# Patient Record
Sex: MALE | Race: WHITE | HISPANIC OR LATINO | Employment: FULL TIME | URBAN - METROPOLITAN AREA
[De-identification: names, ages, dates, MRNs, and addresses within clinical notes are randomized per-mention and may not be internally consistent; named-entity substitution may affect disease eponyms.]

---

## 2022-09-10 ENCOUNTER — APPOINTMENT (OUTPATIENT)
Dept: RADIOLOGY | Facility: HOSPITAL | Age: 42
End: 2022-09-10
Attending: EMERGENCY MEDICINE
Payer: COMMERCIAL

## 2022-09-10 ENCOUNTER — APPOINTMENT (OUTPATIENT)
Dept: MRI IMAGING | Facility: HOSPITAL | Age: 42
End: 2022-09-10
Attending: PHYSICIAN ASSISTANT
Payer: COMMERCIAL

## 2022-09-10 ENCOUNTER — HOSPITAL ENCOUNTER (EMERGENCY)
Facility: HOSPITAL | Age: 42
Discharge: HOME | End: 2022-09-10
Attending: EMERGENCY MEDICINE | Admitting: EMERGENCY MEDICINE
Payer: COMMERCIAL

## 2022-09-10 VITALS
BODY MASS INDEX: 30.39 KG/M2 | TEMPERATURE: 97.2 F | RESPIRATION RATE: 16 BRPM | WEIGHT: 178 LBS | OXYGEN SATURATION: 100 % | HEART RATE: 73 BPM | HEIGHT: 64 IN | SYSTOLIC BLOOD PRESSURE: 131 MMHG | DIASTOLIC BLOOD PRESSURE: 79 MMHG

## 2022-09-10 DIAGNOSIS — G56.02 CARPAL TUNNEL SYNDROME OF LEFT WRIST: ICD-10-CM

## 2022-09-10 LAB
ANION GAP SERPL CALCULATED.3IONS-SCNC: 8 MMOL/L (ref 5–18)
BUN SERPL-MCNC: 9 MG/DL (ref 8–22)
CALCIUM SERPL-MCNC: 9.1 MG/DL (ref 8.5–10.5)
CHLORIDE BLD-SCNC: 107 MMOL/L (ref 98–107)
CO2 SERPL-SCNC: 24 MMOL/L (ref 22–31)
CREAT SERPL-MCNC: 0.7 MG/DL (ref 0.7–1.3)
GFR SERPL CREATININE-BSD FRML MDRD: >90 ML/MIN/1.73M2
GLUCOSE BLD-MCNC: 110 MG/DL (ref 70–125)
HOLD SPECIMEN: NORMAL
POTASSIUM BLD-SCNC: 4 MMOL/L (ref 3.5–5)
SODIUM SERPL-SCNC: 139 MMOL/L (ref 136–145)

## 2022-09-10 PROCEDURE — 82310 ASSAY OF CALCIUM: CPT | Performed by: PHYSICIAN ASSISTANT

## 2022-09-10 PROCEDURE — 36415 COLL VENOUS BLD VENIPUNCTURE: CPT | Performed by: EMERGENCY MEDICINE

## 2022-09-10 PROCEDURE — A9585 GADOBUTROL INJECTION: HCPCS | Performed by: EMERGENCY MEDICINE

## 2022-09-10 PROCEDURE — 70553 MRI BRAIN STEM W/O & W/DYE: CPT

## 2022-09-10 PROCEDURE — 71046 X-RAY EXAM CHEST 2 VIEWS: CPT

## 2022-09-10 PROCEDURE — 255N000002 HC RX 255 OP 636: Performed by: EMERGENCY MEDICINE

## 2022-09-10 PROCEDURE — 96374 THER/PROPH/DIAG INJ IV PUSH: CPT | Mod: 59

## 2022-09-10 PROCEDURE — 250N000011 HC RX IP 250 OP 636: Performed by: EMERGENCY MEDICINE

## 2022-09-10 PROCEDURE — 72141 MRI NECK SPINE W/O DYE: CPT

## 2022-09-10 PROCEDURE — 99285 EMERGENCY DEPT VISIT HI MDM: CPT | Mod: 25

## 2022-09-10 RX ORDER — KETOROLAC TROMETHAMINE 15 MG/ML
15 INJECTION, SOLUTION INTRAMUSCULAR; INTRAVENOUS ONCE
Status: COMPLETED | OUTPATIENT
Start: 2022-09-10 | End: 2022-09-10

## 2022-09-10 RX ORDER — GADOBUTROL 604.72 MG/ML
10 INJECTION INTRAVENOUS ONCE
Status: COMPLETED | OUTPATIENT
Start: 2022-09-10 | End: 2022-09-10

## 2022-09-10 RX ADMIN — GADOBUTROL 10 ML: 604.72 INJECTION INTRAVENOUS at 13:28

## 2022-09-10 RX ADMIN — KETOROLAC TROMETHAMINE 15 MG: 15 INJECTION, SOLUTION INTRAMUSCULAR; INTRAVENOUS at 12:13

## 2022-09-10 ASSESSMENT — ENCOUNTER SYMPTOMS
CHILLS: 0
FLANK PAIN: 0
CHEST TIGHTNESS: 0
PHOTOPHOBIA: 0
NUMBNESS: 1
HEMATURIA: 0
DYSURIA: 0
FATIGUE: 0
SHORTNESS OF BREATH: 0
ABDOMINAL PAIN: 0
FEVER: 0
EYE PAIN: 0
WEAKNESS: 1
WOUND: 0

## 2022-09-10 NOTE — ED PROVIDER NOTES
"Emergency Department Midlevel Supervisory Note     I personally saw the patient and performed a substantive portion of the visit including all aspects of the medical decision making.    ED Course:  11:27 AM Enoc Clarke PA-C staffed patient with me. I agree with their assessment and plan of management, and I will see the patient.  11:36 AM I met with the patient to introduce myself, gather additional history, perform my initial exam, and discuss the plan.     Brief HPI:     Louis De Leon is a 42 year old male who presents for evaluation of one-sided arm weakness, pain.    History obtained using  via language line.     Patient reports he has had intermittent left upper extremity pain and weakness for the past 2 months. He states that he is unable to make a fist and has difficulty moving his fingers. He notes pain that is worse at night, not when he is lying down, or using the arms. He notes that he has pain when he lays down at night, and the pain starts in his left wrist, and radiates up his left arm.    I, Clyde Padgett, am serving as a scribe to document services personally performed by Brock Yuen MD,, based on my observations and the provider's statements to me.   I, Brock Yuen MD, attest that Clyde Padgett was acting in a scribe capacity, has observed my performance of the services and has documented them in accordance with my direction.    Brief Physical Exam: /79 (BP Location: Left arm)   Pulse 73   Temp 97.2  F (36.2  C) (Temporal)   Resp 16   Ht 1.626 m (5' 4\")   Wt 80.7 kg (178 lb)   SpO2 100%   BMI 30.55 kg/m    Constitutional:  Alert, in no acute distress  EYES: Conjunctivae clear  HENT:  Atraumatic, normocephalic  Respiratory:  Respirations even, unlabored, in no acute respiratory distress  Cardiovascular:  Regular rate and rhythm, good peripheral perfusion  GI: Soft, nondistended, nontender, no palpable masses, no rebound, no guarding "   Musculoskeletal:  No edema. No cyanosis. Range of motion major extremities intact.    Integument: Warm, Dry, No erythema, No rash.   Neurologic:  Alert & oriented, no focal deficits noted  Psych: Normal mood and affect     MDM:  Again, patient is a 42-year-old male who presents with left upper extremity weakness.  Patient appears nontoxic with stable vital signs.  On my exam he has no outward signs of trauma, no skin changes or rashes, no joint swelling, erythema or warmth.  He endorses equal sensation bilateral upper extremities including bilateral axial, radial, ulnar and median nerve distributions.  Denies any recent falls or trauma.  No unilateral upper extremity swelling.  Symptoms not aggravated by elevating hands or with neck rotation.  History and exam consistent with cervical radiculopathy, considered but lower suspicion for cervical spinal cord lesion, myelitis, epidural bleed or abscess, nothing to suggest cervical spine fracture or dislocation, considered but overall very low suspicion for CVA and again the patient has had the symptoms for quite some time certainly no indication for code stroke.  Low suspicion for thoracic outlet syndrome or shoulder fracture, dislocation.  Nothing to suggest ACS, PE, dissection or other more malicious etiology of symptoms.  We will obtain screening labs, chest x-ray and MRI imaging of the head and cervical spine and likely consult with neurosurgery or neurology depending upon the results.  Patient was given Toradol for pain.    ED Course as of 09/10/22 1754   Sat Sep 10, 2022   1141 Patient is a 42-year-old male with no past medical history who presents emergency department for evaluation of tingling, numbness, weakness in his left hand which has been ongoing for 2 months.  Additionally notes weakness in his right index finger.  Notes that the weakness and numbness has been progressive, slowly getting worse for the last 2 months.  Was seen previously in [facility]  family [facility] for the same thing, however I am unable to review their notes, the patient is unsure what kind of testing was done.  On examination, sensation to soft touch is intact upper lower extremities, no pronator drift, no dizziness, no headaches, extraocular eye movements are intact, no chest pain.  Range of motion of the shoulder is intact, range of motion of the elbow is intact.  Patient is unable to make a complete fist with his left hand.  When assessing for individual finger strength, against resisted movement, the patient is able to resist movement with all fingers on the left hand except for the middle finger.  No other acute abnormalities noted on the left arm.  Mild tenderness to palpation of the medial aspect of the wrist over the carpal tunnel.  Additionally patient notes inability to completely flex his right index finger.  The remainder of his right hand is unremarkable, no tenderness.  No recent trauma, no fevers, nothing to suggest infection, or broken bones, or tendon damage.  Patient is not any medications, does not have a past medical history.  Only medication he takes occasionally is Tylenol.  No other focal exam findings, or focal neurological signs.  Nothing to suggest neurological compromise.  Pulses are intact, nothing to suggest upper extremity DVT.  No cardiac history.  No chest pain makes atypical ACS extremely unlikely.     1144 No trauma makes a fracture unlikely.  Differential is somewhat obscure given the patient's odd constellation of symptoms, however could be some cervical radiculopathy, or lumbar herniation in the neck, causing some nerve impingement and nerve symptoms.  Additionally could be symptoms related to carpal tunnel syndrome.  Plan will be to obtain MRI of the brain and cervical spine to rule out any neurological abnormality, additionally will order a BMP to evaluate his electrolytes.   1426 Cervical spine MRI w/o contrast  Head MRI shows no acute infarct, mass, or  hemorrhage.  Cervical spine MRI shows normal cervical and upper thoracic cord, some suggestion of abnormal cord signal on some sequences, however does not well correlated with the sagittal and axial T2 weighted sequences.  Presumably artifact.  Mild to moderate degenerative changes without significant canal narrowing at any level.  Mild right foraminal narrowing at C4-C5, and C5-C6.   1611 Discussed the case with both neurosurgery, as well as neurology.  Both are not terribly impressed with the imaging findings, no concern for any acute stroke, or other neurological abnormality at this time.  Patient seen and updated on imaging results, likely that symptoms are secondary to carpal tunnel syndrome.  Will recommend that he discharges with a wrist splint, follows up with neurology.  I have placed a consult for neurology, they should call him to schedule an appointment.  I will put the number for his discharge in his discharge paperwork.  Additionally will provide a work note for the patient.  Discussed return precautions, patient and brother expressed understanding.  Plan for discharge at this time.  Likely diagnosis is carpal tunnel syndrome of the left wrist.       1. Carpal tunnel syndrome of left wrist        Labs and Imaging:  Results for orders placed or performed during the hospital encounter of 09/10/22   MR Brain w/o & w Contrast    Impression    IMPRESSION:  HEAD MRI:   1.  No acute infarct, mass, mass effect, or hemorrhage.    CERVICAL SPINE MRI:  1.  Normal cervical and upper thoracic cord. There is suggestion of abnormal cord signal on some sequences, mainly sagittal STIR sequence; however it is not well correlated with sagittal and axial T2-weighted sequences and presumably artifactual.  2.  Minimal to mild degenerative changes without significant canal narrowing at any level.  3.  Mild right foraminal narrowing at C4-C5 and C5-C6.   Cervical spine MRI w/o contrast    Impression    IMPRESSION:  HEAD MRI:    1.  No acute infarct, mass, mass effect, or hemorrhage.    CERVICAL SPINE MRI:  1.  Normal cervical and upper thoracic cord. There is suggestion of abnormal cord signal on some sequences, mainly sagittal STIR sequence; however it is not well correlated with sagittal and axial T2-weighted sequences and presumably artifactual.  2.  Minimal to mild degenerative changes without significant canal narrowing at any level.  3.  Mild right foraminal narrowing at C4-C5 and C5-C6.   XR Chest 2 Views    Impression    IMPRESSION: Negative chest.   Extra Blue Top Tube   Result Value Ref Range    Hold Specimen JIC    Extra Red Top Tube   Result Value Ref Range    Hold Specimen JIC    Extra Green Top (Lithium Heparin) Tube   Result Value Ref Range    Hold Specimen JIC    Extra Purple Top Tube   Result Value Ref Range    Hold Specimen JIC    Basic metabolic panel   Result Value Ref Range    Sodium 139 136 - 145 mmol/L    Potassium 4.0 3.5 - 5.0 mmol/L    Chloride 107 98 - 107 mmol/L    Carbon Dioxide (CO2) 24 22 - 31 mmol/L    Anion Gap 8 5 - 18 mmol/L    Urea Nitrogen 9 8 - 22 mg/dL    Creatinine 0.70 0.70 - 1.30 mg/dL    Calcium 9.1 8.5 - 10.5 mg/dL    Glucose 110 70 - 125 mg/dL    GFR Estimate >90 >60 mL/min/1.73m2     I have reviewed the relevant laboratory and radiology studies    Procedures:  I was present for the key portions of this procedure: none    Brock Yuen MD  [facility] EMERGENCY DEPARTMENT  [address] [address]  [phone]       Brock Yuen MD  09/10/22 7204

## 2022-09-10 NOTE — ED TRIAGE NOTES
The patient states left shoulder to the hand pain and weakness x approx 2 months, Right arm shoulder down to hand weakness and notes he does not have enough strength to make a fist. Pt states that he has also has right knee to foot pain and left knee to foot pain. The pt feels left arm swelling. The pt has tried to get into see a primary MD but has not been able to get in. The right hand pointer finger swelling.

## 2022-09-10 NOTE — DISCHARGE INSTRUCTIONS
You were seen here in the emergency department for evaluation of numbness, tingling to your left hand.  We did do an MRI which did not show any neurological deficits, or any sign of any intracranial bleeding, or other problem.  I did speak with neurosurgery, as well as neurology regarding recommendations.  Neurology did recommend a wrist splint, which we will send you home with, as well as follow-up with them in clinic for further testing.  Return to the emergency department if you develop any new or worsening symptoms.  Especially return to the emergency department if you develop any chest pain, shortness of breath, difficulty breathing, worsening numbness or tingling, difficulty with movement, or any other symptoms.    For pain or fever you may use:  -Tylenol 650 mg every 6 hours.  Max 4000 mg in 24 hours  Do not use thismedication with alcohol as it can cause liver problems.  -Ibuprofen 600 mg every 6 hours.  Max 3500 mg in 24 hours  Do not take this medication if you have a history of a GI bleed or have kidney problems.  You may use both of these medications at the same time or you can alternate them every 3 hours.  For example, Tylenol at 6 AM, ibuprofen at 9 AM, Tylenol at noon, etc.

## 2022-09-10 NOTE — ED PROVIDER NOTES
EMERGENCY DEPARTMENT ENCOUNTER      NAME: Louis De Leon  AGE: 42 year old male  YOB: 1980  MRN: [id number]  EVALUATION DATE & TIME: No admission date for patient encounter.    PCP: No primary care provider on file.    ED PROVIDER: Lizandro Clarke PA-C      Chief Complaint   Patient presents with     One-sided Weakness         FINAL IMPRESSION:  1. Carpal tunnel syndrome of left wrist        ED COURSE & MEDICAL DECISION MAKING:    Pertinent Labs & Imaging studies reviewed. (See chart for details)  11:34 AM I met the patient and performed my initial interview and exam. Staffed with Dr. Yuen   2:56 PM Spoke with Latonia, Neurosurgery. No immediate recommendations from a neurosurgical perspective.   3:12 PM Spoke with Dr. Guan, neurology to discuss the patients symptoms. Given the negative MRI, neurology has minimal concern. Presentation seems most consistent with possible carpal tunnel syndrome. Recommend follow up in neurology clinic for possible EMG and then wrist splint.   3:36 PM Patient seen and reevaluated, updated on plan for follow up in clinic. Will be given a wrist splint prior to discharge here from the ED.     42 year old male presents to the Emergency Department for evaluation of left arm weakness, numbness, decrease strength     ED Course as of 09/10/22 1613   Sat Sep 10, 2022   1141 Patient is a 42-year-old male with no past medical history who presents emergency department for evaluation of tingling, numbness, weakness in his left hand which has been ongoing for 2 months.  Additionally notes weakness in his right index finger.  Notes that the weakness and numbness has been progressive, slowly getting worse for the last 2 months.  Was seen previously in [facility] for the same thing, however I am unable to review their notes, the patient is unsure what kind of testing was done.  On examination, sensation to soft touch is intact upper lower extremities, no  pronator drift, no dizziness, no headaches, extraocular eye movements are intact, no chest pain.  Range of motion of the shoulder is intact, range of motion of the elbow is intact.  Patient is unable to make a complete fist with his left hand.  When assessing for individual finger strength, against resisted movement, the patient is able to resist movement with all fingers on the left hand except for the middle finger.  No other acute abnormalities noted on the left arm.  Mild tenderness to palpation of the medial aspect of the wrist over the carpal tunnel.  Additionally patient notes inability to completely flex his right index finger.  The remainder of his right hand is unremarkable, no tenderness.  No recent trauma, no fevers, nothing to suggest infection, or broken bones, or tendon damage.  Patient is not any medications, does not have a past medical history.  Only medication he takes occasionally is Tylenol.  No other focal exam findings, or focal neurological signs.  Nothing to suggest neurological compromise.  Pulses are intact, nothing to suggest upper extremity DVT.  No cardiac history.  No chest pain makes atypical ACS extremely unlikely.     1144 No trauma makes a fracture unlikely.  Differential is somewhat obscure given the patient's odd constellation of symptoms, however could be some cervical radiculopathy, or lumbar herniation in the neck, causing some nerve impingement and nerve symptoms.  Additionally could be symptoms related to carpal tunnel syndrome.  Plan will be to obtain MRI of the brain and cervical spine to rule out any neurological abnormality, additionally will order a BMP to evaluate his electrolytes.   1426 Cervical spine MRI w/o contrast  Head MRI shows no acute infarct, mass, or hemorrhage.  Cervical spine MRI shows normal cervical and upper thoracic cord, some suggestion of abnormal cord signal on some sequences, however does not well correlated with the sagittal and axial T2 weighted  sequences.  Presumably artifact.  Mild to moderate degenerative changes without significant canal narrowing at any level.  Mild right foraminal narrowing at C4-C5, and C5-C6.   1611 Discussed the case with both neurosurgery, as well as neurology.  Both are not terribly impressed with the imaging findings, no concern for any acute stroke, or other neurological abnormality at this time.  Patient seen and updated on imaging results, likely that symptoms are secondary to carpal tunnel syndrome.  Will recommend that he discharges with a wrist splint, follows up with neurology.  I have placed a consult for neurology, they should call him to schedule an appointment.  I will put the number for his discharge in his discharge paperwork.  Additionally will provide a work note for the patient.  Discussed return precautions, patient and brother expressed understanding.  Plan for discharge at this time.  Likely diagnosis is carpal tunnel syndrome of the left wrist.        At the conclusion of the encounter I discussed the results of all of the tests and the disposition. The questions were answered. The patient or family acknowledged understanding and was agreeable with the care plan.     0 mimutes of critical care time     MEDICATIONS GIVEN IN THE EMERGENCY:  Medications   ketorolac (TORADOL) injection 15 mg (15 mg Intravenous Given 9/10/22 1213)   gadobutrol (GADAVIST) injection 10 mL (10 mLs Intravenous Given 9/10/22 1328)       NEW PRESCRIPTIONS STARTED AT TODAY'S ER VISIT  There are no discharge medications for this patient.         =================================================================    HPI    Patient information was obtained from: Patient     Use of : Yes Phone- Language Azeri         Louis De Leon is a 42 year old male with no significant past medical history who presents to this ED for evaluation of 2 months of off-and-on one-sided arm weakness, some pain in the left wrist.  Patient notes  that he is unable to make a complete fist with the left hand, feels like he has difficult time moving his fingers sometimes on the left hand.  He notes pain that is worse at night, not when he is lying down, or using the arms.  He notes that he has pain when he lays down at night, and the pain starts in his left wrist, and radiates up his left arm.  Additionally notes he has difficult time moving his right index finger, has weakness.  Does have sensation intact.  He is right-hand dominant.  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  He is still able to work.  No fevers.  No cough.  No chest pain.  No shortness of breath.  No headaches, dizziness, changes in vision, recent trauma, loss of consciousness, or other past medical history.  He does not take any medications on a daily basis other than Tylenol.  No other known past medical history.  Notes that the symptoms are intermittent, however been slowly and progressively getting worse for the last 2 months.  No other acute complaints other than the left sided arm, wrist pain and weakness.      REVIEW OF SYSTEMS   Review of Systems   Constitutional: Negative for chills, fatigue and fever.   Eyes: Negative for photophobia and pain.   Respiratory: Negative for chest tightness and shortness of breath.    Cardiovascular: Negative for chest pain.   Gastrointestinal: Negative for abdominal pain.   Genitourinary: Negative for dysuria, flank pain and hematuria.   Skin: Negative for wound.   Neurological: Positive for weakness and numbness.        Present to the left hand, left wrist, right index finger.    All other systems reviewed and are negative.       PAST MEDICAL HISTORY:  History reviewed. No pertinent past medical history.    PAST SURGICAL HISTORY:  History reviewed. No pertinent surgical history.        CURRENT MEDICATIONS:    No current outpatient medications on file.       ALLERGIES:  No Known Allergies    FAMILY HISTORY:  History reviewed. No  "pertinent family history.    SOCIAL HISTORY:        VITALS:  /79 (BP Location: Left arm)   Pulse 73   Temp 97.2  F (36.2  C) (Temporal)   Resp 16   Ht 1.626 m (5' 4\")   Wt 80.7 kg (178 lb)   SpO2 100%   BMI 30.55 kg/m      PHYSICAL EXAM    Physical Exam  Vitals and nursing note reviewed.   Constitutional:       General: He is not in acute distress.     Appearance: Normal appearance. He is normal weight. He is not toxic-appearing or diaphoretic.   HENT:      Head: Normocephalic.      Right Ear: External ear normal.      Left Ear: External ear normal.   Eyes:      General: No visual field deficit.  Cardiovascular:      Rate and Rhythm: Normal rate and regular rhythm.      Heart sounds: Normal heart sounds. No murmur heard.    No friction rub. No gallop.   Pulmonary:      Effort: Pulmonary effort is normal. No respiratory distress.      Breath sounds: No wheezing.   Abdominal:      General: Abdomen is flat. Bowel sounds are normal. There is no distension.      Palpations: Abdomen is soft.      Tenderness: There is no abdominal tenderness. There is no right CVA tenderness, left CVA tenderness, guarding or rebound.   Musculoskeletal:         General: No swelling, tenderness, deformity or signs of injury.      Comments: ROM intact passively, see neuro section for greater detail on strength assessment for the individual fingers. No tenderness with palpation over the rest of the left arm. ROM intact in left shoulder, left elbow, no difficulties.    Skin:     General: Skin is warm.   Neurological:      Mental Status: He is alert. Mental status is at baseline.      Cranial Nerves: No cranial nerve deficit or facial asymmetry.      Motor: Weakness present. No pronator drift.      Comments: Unable to make a fist with the left hand. Weakness when attempting to resist with flexion on the right index finger. Strength intact and no weakness with resisted movement except for with the middle finger on the left hand. " Sensation to soft touch intact to upper and lower extremities bilaterally.        LAB:  All pertinent labs reviewed and interpreted.  Labs Ordered and Resulted from Time of ED Arrival to Time of ED Departure   BASIC METABOLIC PANEL - Normal       Result Value    Sodium 139      Potassium 4.0      Chloride 107      Carbon Dioxide (CO2) 24      Anion Gap 8      Urea Nitrogen 9      Creatinine 0.70      Calcium 9.1      Glucose 110      GFR Estimate >90         RADIOLOGY:  Reviewed all pertinent imaging. Please see official radiology report.  XR Chest 2 Views   Final Result   IMPRESSION: Negative chest.      Cervical spine MRI w/o contrast   Final Result   IMPRESSION:   HEAD MRI:    1.  No acute infarct, mass, mass effect, or hemorrhage.      CERVICAL SPINE MRI:   1.  Normal cervical and upper thoracic cord. There is suggestion of abnormal cord signal on some sequences, mainly sagittal STIR sequence; however it is not well correlated with sagittal and axial T2-weighted sequences and presumably artifactual.   2.  Minimal to mild degenerative changes without significant canal narrowing at any level.   3.  Mild right foraminal narrowing at C4-C5 and C5-C6.      MR Brain w/o & w Contrast   Final Result   IMPRESSION:   HEAD MRI:    1.  No acute infarct, mass, mass effect, or hemorrhage.      CERVICAL SPINE MRI:   1.  Normal cervical and upper thoracic cord. There is suggestion of abnormal cord signal on some sequences, mainly sagittal STIR sequence; however it is not well correlated with sagittal and axial T2-weighted sequences and presumably artifactual.   2.  Minimal to mild degenerative changes without significant canal narrowing at any level.   3.  Mild right foraminal narrowing at C4-C5 and C5-C6.        PROCEDURES:   None.     Lizandro Clarke PA-C  Emergency Medicine  [facility] EMERGENCY DEPARTMENT  [facility] [facility] [address] [phone]  Dept:  [phone]     Lizandro Clarke PA-C  09/10/22 8639

## 2022-09-10 NOTE — Clinical Note
Louis De Leon was seen and treated in our emergency department on 9/10/2022.  He may return to work on 09/19/2022.  You were seen here in the emergency department for evaluation of carpal tunnel.  Please excuse the patient from using his wrist, allow him to work with a wrist splint in place.  Patient has a condition called carpal tunnel, requires the wrist splint in place.  May not be able to do lifting, or heavy moving.  Please accommodate this in his job.     If you have any questions or concerns, please don't hesitate to call.      Lizandro Clarke PA-C

## 2022-09-10 NOTE — PROGRESS NOTES
Neurosurgery Treatment Plan:   Reviewed patients chart  42 year old right handed male presents with complaint of left upper extremity and right index finger paraesthesia with left hand  weakness progressive over 2 months, denies gait instability or changes in bowel or bladder, per provider notes inability to close fist on left with middle finger flexion weakness otherwise good strength throughout sensation intact to LT throughout       Images:   cervical MRI reviewed personally no high grade spinal stenosis noted, mild right sided foraminal narrowing C4-C5 and C5-C6       Plan:   MRI findings do not correlate with patient complaint of left sided upper extremity paraesthesia and weakness  Would recommend outpatient EMG/NCTof bilateral upper extremities and evaluation by neurology   No urgent neurosurgical intervention presently indicated   Will arrange outpatient follow up      Latonia Mckinney PA-C  [facility] Neurosurgery  O: [phone]

## 2022-09-10 NOTE — ED TRIAGE NOTES
The patients brother is interpreting [place] in triage at this time.      Triage Assessment     Row Name 09/10/22 1049       Triage Assessment (Adult)    Airway WDL WDL    Additional Documentation Breath Sounds (Group)       Respiratory WDL    Respiratory WDL WDL       Skin Circulation/Temperature WDL    Skin Circulation/Temperature WDL WDL       Cardiac WDL    Cardiac WDL WDL       Peripheral/Neurovascular WDL    Peripheral Neurovascular WDL X       Cognitive/Neuro/Behavioral WDL    Cognitive/Neuro/Behavioral WDL motor response

## 2022-09-12 ENCOUNTER — TELEPHONE (OUTPATIENT)
Dept: NEUROSURGERY | Facility: CLINIC | Age: 42
End: 2022-09-12

## 2022-09-12 DIAGNOSIS — R20.2 NUMBNESS AND TINGLING IN BOTH HANDS: Primary | ICD-10-CM

## 2022-09-12 DIAGNOSIS — R20.0 NUMBNESS AND TINGLING IN BOTH HANDS: Primary | ICD-10-CM

## 2022-09-12 NOTE — TELEPHONE ENCOUNTER
PATIENT NAME:  Louis De Leon  YOB: 1980  MRN: [id number]  SURGEON: Dr. Pandey  DATE of CONSULT: 09/10/2022  Consult for Left CTS    FOLLOW-UP PLAN:    Hospital Follow Up Visit: after neurology work up on 12/09/2022  Provider: RUPESH    DIAGNOSTICS:  EMG  DISPOSITION:  Home same day    ADDITIONAL INSTRUCTIONS FOR MEDICAL STAFF:      Raissa Huber RN, CNRN

## 2022-09-19 NOTE — TELEPHONE ENCOUNTER
09/19/22- LM x1 for pt  Via interp to schedule f/u with RUPESH once EMG is completed. EMG is scheduled for 12/09/2022